# Patient Record
Sex: MALE | Race: BLACK OR AFRICAN AMERICAN | Employment: FULL TIME | URBAN - METROPOLITAN AREA
[De-identification: names, ages, dates, MRNs, and addresses within clinical notes are randomized per-mention and may not be internally consistent; named-entity substitution may affect disease eponyms.]

---

## 2022-03-31 ENCOUNTER — HOSPITAL ENCOUNTER (OUTPATIENT)
Dept: PHYSICAL THERAPY | Age: 37
Discharge: HOME | End: 2022-03-31
Payer: MEDICAID

## 2022-03-31 PROCEDURE — 97161 PT EVAL LOW COMPLEX 20 MIN: CPT

## 2022-03-31 NOTE — PROGRESS NOTES
[address] [facility] AT [address] [facility], [place] [address]  Phone: [phone] Fax: [phone] / [address] [place] [address] PHYSICAL THERAPY SERVICES  Patient Name: Rome Frey : 1985   Medical   Diagnosis: Ankle pain, right [M25.571] Treatment Diagnosis: Left ankle pain   Onset Date: 2021     Referral Source: Marta Nayak NP Start of Care [facility]): 3/31/2022   Prior Hospitalization: See medical history Provider #: [id number]   Prior Level of Function: Functionally independent, lives in loft apartment, works in [employer]   Comorbidities: asthma   Medications: Verified on Patient Summary List   The Plan of Care and following information is based on the information from the initial evaluation.     ========================================================================    Assessment / key information:  Pt is a pleasant 39 y.o. male who presents with c/o left ankle pain. The patient reports an onset of sharp, pinching anterior left ankle pain in 2021 after he jumped down from a raised surface while playing with his son. Signs/symptoms at [facility] consistent with left anterior ankle impingement. Functional deficits include: decreased dorsiflexion AROM, decreased left ankle strength, inability to return to PLOF running activities.   Rehab potential is good due to positive response to posterior talocrural joint mobilization at [facility]. Pt would benefit from skilled PT to address above deficits to improve Pt's function and ability to return to PLOF running activities without pain.      ========================================================================    Eval Complexity: History: MEDIUM  Complexity : 1-2 comorbidities / personal factors will impact the outcome/ POC Exam:LOW Complexity : 1-2 Standardized tests and measures addressing body structure, function, activity limitation and / or participation in recreation Presentation: LOW Complexity : Stable, uncomplicated  Clinical Decision Making:MEDIUM Complexity : FOTO score of 26-74Overall Complexity:LOW   Problem List: pain affecting function, decrease ROM, decrease strength, impaired gait/ balance, decrease ADL/ functional abilitiies, decrease activity tolerance, decrease flexibility/ joint mobility and decrease transfer abilities   Treatment Plan may include any combination of the following: Therapeutic exercise, Therapeutic activities, Neuromuscular re-education, Physical agent/modality, Gait/balance training, Manual therapy, Aquatic therapy, Patient education, Self Care training, Functional mobility training, Home safety training and Stair training  Patient / Family readiness to learn indicated by: asking questions and interest    Persons(s) to be included in education: patient (P)  Barriers to Learning/Limitations: None  Measures taken:    Patient Goal (s): \"return to running\"   Patient self reported health status: excellent  Rehabilitation Potential: good    GOALS-  Short Term Goals: To be accomplished in 1 week  - Goal: Pt to be compliant with initial HEP to improve ankle dorsiflexion AROM. Status at last note/certification: Established and reviewed with Pt  Long Term Goals: To be accomplished in 10 treatments  - Goal: Pt to demo left ankle DF AROM to at least 10 deg with knee bent to improve ease with squatting. Status at last note/certification: 6 deg  - Goal: Pt to demo left ankle DF/inversion MMT of 5/5 to improve ease with gait/functional mobility. Status at last note/certification: 4/5  - Goal: Pt to jog for at least 5 minutes at preferred pace without pain to demo improved ability to return to running program.  Status at last note/certification: unable to run  - Goal: Pt to report FOTO score of at least 67 pts to show improved function and quality of life.   Status at last note/certification: FOTO 51 pts       Frequency / Duration:   -Patient to be seen  2 times per week for 10  treatments:     Patient / Caregiver education and instruction: self care, activity modification and exercises    Therapist Signature: Maxim Branham Date: 1/76/3461   Certification Period:  Time: 7:36 AM   ========================================================================  I certify that the above Physical Therapy Services are being furnished while the patient is under my care. I agree with the treatment plan and certify that this therapy is necessary. Physician Signature:        Date:       Time: ___                                            Alex Hill NP. Please sign and return to [facility] or you may fax the signed copy to [phone]. Thank you.

## 2022-03-31 NOTE — PROGRESS NOTES
PT DAILY TREATMENT NOTE     Patient Name: Kade Lamar  Date:3/31/2022  : 1985  [x]  Patient  Verified  Payor: [employer] MEDICAID / Plan: [facility] [employer]US / Product Type: Managed Care Medicaid /    In time:9:10  Out time:9:56  Total Treatment Time (min): 46  Visit #: 1 of 10    Medicare/BCBS Only   Total Timed Codes (min):  15 1:1 Treatment Time:  46       Treatment Area: Ankle pain, right [M25.571]    SUBJECTIVE  Pain Level (0-10 scale): 6  Any medication changes, allergies to medications, adverse drug reactions, diagnosis change, or new procedure performed?: [x] No    [] Yes (see summary sheet for update)  Subjective functional status/changes:   [] No changes reported    CC: left ankle pain  History/Mechanism of Injury: 2021- jumping down from raised surface in park with son  Current Symptoms/Comlaints: pain in dorsum of left foot and anterior ankle, occasionally sending pain to medial/lateral aspect of ankle, sharp pain in front of left ankle  MRI demonstrated pinched tendon  Pain-  Current: 6/10     Worst: 12/10   Best: 3/10  Aggravated By: constant weight bearing  Alleviated By: RICE, rest, lidocaine patch  Previous Treatment/Compliance: injection  Mobility Devices:   PMHx/Surgical Hx: asthma  Work Hx: works in [employer], difficulty climbing ladders, difficulty with prolonged standing/sitting  Living Situation: lives in loft apartment  Household Modifications: Hobbies: playing with the kids  PLOF: functionally independent  Limitations to PLOF: running, playing with children, climbing ladder  Pt Goals: get back to running (longer distance)    OBJECTIVE    31 min [x]Eval                  []Re-Eval     15 min Therapeutic Activity:  []  See flow sheet : Patient education on therapy assessment, prognosis, expectations for therapy sessions, patient goals, talocrural mobilization, and HEP.    Rationale: to improve the patients ability to adhere to HEP and therapy sessions for increased compliance when working toward therapy goals. With   [] TE   [x] TA   [] neuro   [] other: Patient Education: [x] Review HEP    [] Progressed/Changed HEP based on:   [] positioning   [] body mechanics   [] transfers   [] heat/ice application    [] other:      Other Objective/Functional Measures:     Observation: no discernible edema  Palpation: TTP at anterior ankle joint line, inferior to medial malleolus    Ankle AROM:                                           AROM (deg)                 Right Left Effect   Dorsiflexion with Knee Bent 10 6 pain   Dorsiflexion with Knee Extended      Plantar Flexion 52 44    Inversion 40 34    Eversion 29 17 pain     Strength:   Right (/5) Left (/5)   Hip     Flexion 5 5             Abduction 5 5             Adduction               Extension 4 4             ER               IR     Knee   Extension 5 5              Flexion 5 5   Ankle   Dorsiflexion 5 4               PF                  Inversion 5 4               Eversion 5 5     Girth:    Right Left   2 in above malleolus      Transmalleolar 26.7cm 26.9cm   Midfoot     Figure 8            -    Gait: unremarkable    Functional Squat: decreased pain following posterior TC mobilizations    Stair Negotiation: reciprocal    Balance: SLS 30\" B, more painful left    Pain Level (0-10 scale) post treatment: 3    ASSESSMENT/Changes in Function: See POC    Patient will continue to benefit from skilled PT services to modify and progress therapeutic interventions, address functional mobility deficits, address ROM deficits, address strength deficits, analyze and address soft tissue restrictions, analyze and cue movement patterns, analyze and modify body mechanics/ergonomics, assess and modify postural abnormalities, address imbalance/dizziness and instruct in home and community integration to attain remaining goals.      [x]  See Plan of Care  []  See progress note/recertification  []  See Discharge Summary         Progress towards goals / Updated goals:  See POC    PLAN  [x]  Upgrade activities as tolerated     []  Continue plan of care  [x]  Update interventions per flow sheet       []  Discharge due to:_  []  Other:_      Gloria Vail 3/31/2022  7:35 AM    Future Appointments   Date Time Provider Aileen Chung   3/31/2022  9:15 AM Evan Sorensen [facility]

## 2022-04-14 ENCOUNTER — APPOINTMENT (OUTPATIENT)
Dept: PHYSICAL THERAPY | Age: 37
End: 2022-04-14

## 2022-04-26 ENCOUNTER — APPOINTMENT (OUTPATIENT)
Dept: PHYSICAL THERAPY | Age: 37
End: 2022-04-26

## 2022-04-28 ENCOUNTER — APPOINTMENT (OUTPATIENT)
Dept: PHYSICAL THERAPY | Age: 37
End: 2022-04-28

## 2022-05-03 ENCOUNTER — APPOINTMENT (OUTPATIENT)
Dept: PHYSICAL THERAPY | Age: 37
End: 2022-05-03

## 2022-05-05 ENCOUNTER — APPOINTMENT (OUTPATIENT)
Dept: PHYSICAL THERAPY | Age: 37
End: 2022-05-05

## 2022-05-10 ENCOUNTER — APPOINTMENT (OUTPATIENT)
Dept: PHYSICAL THERAPY | Age: 37
End: 2022-05-10

## 2022-05-10 NOTE — PROGRESS NOTES
[facility] AT [address], [place], [place]  Phone: [phone] Fax [phone] SUMMARY  Patient Name: Yee Boyle : 1985   Treatment/Medical Diagnosis: Ankle pain, right [M25.571]   Referral Source: Kevin Mcgrath NP     Date of Initial Visit: 3/31/22 Attended Visits: 1 Missed Visits: 3     SUMMARY OF TREATMENT  Pt is a pleasant 39 y.o. male who presents with c/o left ankle pain. Treatment consisted of initial evaluation appointment to assess pt's deficits and determine treatment strategy. CURRENT STATUS  Patient attended initial evaluation for treatment of left ankle pain. He is being discharged without further reassessment due to noncompliance. Short Term Goals: To be accomplished in 1 week  - Goal: Pt to be compliant with initial HEP to improve ankle dorsiflexion AROM. Status at last note/certification: Established and reviewed with Pt  Current: unable to reassess due to unexpected discharge  Long Term Goals: To be accomplished in 10 treatments  - Goal: Pt to demo left ankle DF AROM to at least 10 deg with knee bent to improve ease with squatting. Status at last note/certification: 6 deg  Current: unable to reassess due to unexpected discharge  - Goal: Pt to demo left ankle DF/inversion MMT of 5/5 to improve ease with gait/functional mobility. Status at last note/certification: 4/5  Current: unable to reassess due to unexpected discharge  - Goal: Pt to jog for at least 5 minutes at preferred pace without pain to demo improved ability to return to running program.  Status at last note/certification: unable to run  Current: unable to reassess due to unexpected discharge  - Goal: Pt to report FOTO score of at least 67 pts to show improved function and quality of life.   Status at last note/certification: FOTO 51 pts   Current: unable to reassess due to unexpected discharge    RECOMMENDATIONS  Discontinue therapy due to lack of attendance or compliance. If you have any questions/comments please contact us directly at [phone]. Thank you for allowing us to assist in the care of your patient. To ensure we are able to process the patients encounter and avoid risk of your patient receiving a bill for our services, please sign and return this discharge summary by 6/10/22. (30days following DC date)    Therapist Signature: Marcello Yan Date: 5/10/22   Reporting Period: 3/31/22-3/31/22 Time: 4:24 PM   NOTE TO PHYSICIAN:  Your patient's insurance requires this discharge note be signed and returned. PLEASE COMPLETE THE ORDERS BELOW AND RETURN TO:  [facility]    ___ I have read the above report and request that my patient be discharged from therapy.      Physician Signature:        Date:       Time:                                        Angelo Davenport NP

## 2022-05-12 ENCOUNTER — APPOINTMENT (OUTPATIENT)
Dept: PHYSICAL THERAPY | Age: 37
End: 2022-05-12